# Patient Record
Sex: FEMALE | Race: BLACK OR AFRICAN AMERICAN | Employment: FULL TIME | URBAN - METROPOLITAN AREA
[De-identification: names, ages, dates, MRNs, and addresses within clinical notes are randomized per-mention and may not be internally consistent; named-entity substitution may affect disease eponyms.]

---

## 2022-01-09 ENCOUNTER — HOSPITAL ENCOUNTER (EMERGENCY)
Age: 32
Discharge: HOME | End: 2022-01-09
Attending: EMERGENCY MEDICINE
Payer: COMMERCIAL

## 2022-01-09 VITALS
WEIGHT: 210 LBS | TEMPERATURE: 99 F | BODY MASS INDEX: 31.83 KG/M2 | DIASTOLIC BLOOD PRESSURE: 80 MMHG | HEIGHT: 68 IN | SYSTOLIC BLOOD PRESSURE: 123 MMHG | OXYGEN SATURATION: 99 % | HEART RATE: 75 BPM | RESPIRATION RATE: 18 BRPM

## 2022-01-09 DIAGNOSIS — U07.1 COVID-19 VIRUS INFECTION: Primary | ICD-10-CM

## 2022-01-09 LAB
COVID-19 RAPID TEST, COVR: DETECTED
FLUAV AG NPH QL IA: NEGATIVE
FLUBV AG NPH QL IA: NEGATIVE
SOURCE, COVRS: ABNORMAL
SPECIMEN SOURCE: NORMAL

## 2022-01-09 PROCEDURE — 99283 EMERGENCY DEPT VISIT LOW MDM: CPT

## 2022-01-09 PROCEDURE — 74011250637 HC RX REV CODE- 250/637: Performed by: NURSE PRACTITIONER

## 2022-01-09 PROCEDURE — 87635 SARS-COV-2 COVID-19 AMP PRB: CPT

## 2022-01-09 PROCEDURE — 87804 INFLUENZA ASSAY W/OPTIC: CPT

## 2022-01-09 RX ORDER — DEXAMETHASONE SODIUM PHOSPHATE 100 MG/10ML
10 INJECTION INTRAMUSCULAR; INTRAVENOUS
Status: COMPLETED | OUTPATIENT
Start: 2022-01-09 | End: 2022-01-09

## 2022-01-09 RX ADMIN — DEXAMETHASONE SODIUM PHOSPHATE 10 MG: 10 INJECTION INTRAMUSCULAR; INTRAVENOUS at 11:20

## 2022-01-09 NOTE — ED TRIAGE NOTES
Pt with ear pain, sore throat and chills since last Saturday. Pt states she was tested for covid Sunday but it was negative. Pt states daughter had covid.     Nat Conn RN

## 2022-01-09 NOTE — Clinical Note
[id number] [address] EMERGENCY DEPT  [address] [address] [phone]    Work/School Note    Date: 1/9/2022     To Whom It May concern:    Archie Jacobs was evaluated by the following provider(s):  Attending Provider: Rosales Quan MD  Nurse Practitioner: Pravin Valles NP.   COVID19 virus is suspected. Per the CDC guidelines we recommend home isolation until the following conditions are all met:    1. At least five days have passed since symptoms first appeared and/or had a close exposure,   2. After home isolation for five days, wearing a mask around others for the next five days,  3. At least 24 have passed since last fever without the use of fever-reducing medications and  4.  Symptoms (eg cough, shortness of breath) have improved      Sincerely,          Peggy Peterson RN

## 2022-01-09 NOTE — Clinical Note
[address] [address] EMERGENCY DEPT  [address] [address] [phone]    Work/School Note    Date: 1/9/2022     To Whom It May concern:    Serenity An was evaluated by the following provider(s):  Attending Provider: Michaelle Larsen MD  Nurse Practitioner: Bette Wyatt NP.   COVID19 virus is suspected. Per the CDC guidelines we recommend home isolation until the following conditions are all met:    1. At least five days have passed since symptoms first appeared and/or had a close exposure,   2. After home isolation for five days, wearing a mask around others for the next five days,  3. At least 24 have passed since last fever without the use of fever-reducing medications and  4.  Symptoms (eg cough, shortness of breath) have improved      Sincerely,          Luis Manuel Black NP

## 2022-01-09 NOTE — Clinical Note
[address] [address] EMERGENCY DEPT  [address] [address] [phone]    Work/School Note    Date: 1/9/2022     To Whom It May concern:    Demarco Alcantara was evaluated by the following provider(s):  Attending Provider: Aurea Bahena MD  Nurse Practitioner: Maximiliano Penny NP.   COVID19 virus is suspected. Per the CDC guidelines we recommend home isolation until the following conditions are all met:    1. At least five days have passed since symptoms first appeared and/or had a close exposure,   2. After home isolation for five days, wearing a mask around others for the next five days,  3. At least 24 have passed since last fever without the use of fever-reducing medications and  4.  Symptoms (eg cough, shortness of breath) have improved      Sincerely,          Garcia Delacruz RN

## 2022-01-09 NOTE — ED PROVIDER NOTES
26-year-old female who presents to the emergency department for complaint of ear fullness, sore throat, cough, and chills since last Saturday. She reports that she got tested for COVID last Sunday and had a negative result. She has been taking Mucinex, which has helped to relieve her symptoms. She states her daughter recently tested positive for COVID. She denies nausea, vomiting, diarrhea, chest pain, or shortness of breath. The history is provided by the patient. Ear Pain  This is a new problem. The current episode started more than 1 week ago. The problem occurs constantly. The problem has not changed since onset. Pertinent negatives include no chest pain, no abdominal pain, no headaches and no shortness of breath. Nothing aggravates the symptoms. Nothing relieves the symptoms.         Past Medical History:   Diagnosis Date    Herpes simplex without mention of complication     takes valtrex    HX OTHER MEDICAL     (+) gonhorrea        Past Surgical History:   Procedure Laterality Date    HX OTHER SURGICAL      EGD diliation          Family History:   Problem Relation Age of Onset    Diabetes Mother     Hypertension Father     Diabetes Maternal Grandmother     Hypertension Maternal Grandmother        Social History     Socioeconomic History    Marital status: SINGLE     Spouse name: Not on file    Number of children: Not on file    Years of education: Not on file    Highest education level: Not on file   Occupational History    Not on file   Tobacco Use    Smoking status: Never Smoker    Smokeless tobacco: Never Used   Substance and Sexual Activity    Alcohol use: No    Drug use: No    Sexual activity: Yes     Partners: Male     Birth control/protection: Injection   Other Topics Concern    Not on file   Social History Narrative    Not on file     Social Determinants of Health     Financial Resource Strain:     Difficulty of Paying Living Expenses: Not on file   Food Insecurity:     Worried About Running Out of Food in the Last Year: Not on file    Ran Out of Food in the Last Year: Not on file   Transportation Needs:     Lack of Transportation (Medical): Not on file    Lack of Transportation (Non-Medical): Not on file   Physical Activity:     Days of Exercise per Week: Not on file    Minutes of Exercise per Session: Not on file   Stress:     Feeling of Stress : Not on file   Social Connections:     Frequency of Communication with Friends and Family: Not on file    Frequency of Social Gatherings with Friends and Family: Not on file    Attends Church Services: Not on file    Active Member of [address] [place] [address] [employer] or Organizations: Not on file    Attends Club or Organization Meetings: Not on file    Marital Status: Not on file   Intimate Partner Violence:     Fear of Current or Ex-Partner: Not on file    Emotionally Abused: Not on file    Physically Abused: Not on file    Sexually Abused: Not on file   Housing Stability:     Unable to Pay for Housing in the Last Year: Not on file    Number of [place] in the Last Year: Not on file    Unstable Housing in the Last Year: Not on file         ALLERGIES: Patient has no known allergies. Review of Systems   Constitutional: Positive for chills and fever. HENT: Positive for congestion, ear pain and sore throat. Respiratory: Positive for cough. Negative for shortness of breath. Cardiovascular: Negative for chest pain. Gastrointestinal: Negative for abdominal pain, diarrhea, nausea and vomiting. Neurological: Negative for headaches. All other systems reviewed and are negative. Vitals:    01/09/22 1111 01/09/22 1224   BP: 127/73 123/80   Pulse: 79 75   Resp: 16 18   Temp: 99 °F (37.2 °C)    SpO2: 99% 99%   Weight: 95.3 kg (210 lb)    Height: 5' 8\" (1.727 m)             Physical Exam  Vitals and nursing note reviewed. Constitutional:       General: She is not in acute distress. Appearance: Normal appearance.  She is not ill-appearing, toxic-appearing or diaphoretic. HENT:      Head: Normocephalic and atraumatic. Right Ear: Hearing, ear canal and external ear normal. No tenderness. A middle ear effusion is present. Tympanic membrane is not erythematous. Left Ear: Hearing, ear canal and external ear normal. No tenderness. A middle ear effusion is present. Tympanic membrane is not erythematous. Nose: Congestion present. Mouth/Throat:      Mouth: Mucous membranes are moist. No angioedema. Pharynx: Oropharynx is clear. Uvula midline. Posterior oropharyngeal erythema present. No pharyngeal swelling, oropharyngeal exudate or uvula swelling. Eyes:      General: No scleral icterus. Extraocular Movements: Extraocular movements intact. Conjunctiva/sclera: Conjunctivae normal.   Cardiovascular:      Rate and Rhythm: Normal rate. Heart sounds: Normal heart sounds. Pulmonary:      Effort: Pulmonary effort is normal. No respiratory distress. Breath sounds: Normal breath sounds. Abdominal:      General: Abdomen is flat. There is no distension. Musculoskeletal:         General: Normal range of motion. Cervical back: Normal range of motion and neck supple. No rigidity. Skin:     General: Skin is warm and dry. Capillary Refill: Capillary refill takes less than 2 seconds. Neurological:      General: No focal deficit present. Mental Status: She is alert and oriented to person, place, and time. Psychiatric:         Mood and Affect: Mood normal.         Behavior: Behavior normal.         Thought Content: Thought content normal.         Judgment: Judgment normal.          MDM  Number of Diagnoses or Management Options  COVID-19 virus infection: new and requires workup  Diagnosis management comments: 68-year-old female presents emergency department today with complaints of ear pain, sore throat, cough, chills since last Saturday. Lung sounds are clear. SPO2 is 99% on room air. Patient has some mild middle ear effusions bilaterally but does not appear as infected. No angioedema. Mild erythema to posterior oropharynx. Rapid Covid is positive. Supportive treatment Discussed and encouraged. I have discussed the results of all labs, procedures, radiographs, and/or treatments with the patient and available family members. Neil Form is agreed upon by the patient and the patient is ready for discharge.  Questions about treatment in the ED and differential diagnosis of presenting condition were answered. Eliza Crowe was given verbal discharge instructions including, but not limited to, importance of returning to the emergency department for any concern of worsening or continued symptoms.  Instructions were given to follow up with a primary care provider or specialist within 1-2 days. Burtis Dress effects of medications, if prescribed, were discussed and patient was advised to refrain from significant physical activity until followed up by primary care physician and to not drive or operate heavy machinery after taking any sedating substances.      During patient's visit to the emergency department in addition to providing emergency care I conducted a COVID-19 counseling visit. She is aware that he needs immediate isolation away from others and that she is to inform any close contacts about her condition. I have also reviewed the signs and symptoms of COVID-19 including the potential for progression of the disease including return precautions and an outpatient plan. We have discussed ways to be successful in the quarantine process and provided instructions about when it is safe to stop strict isolation. She has been informed that the public health department should be contacting her. Dulce Lemos NP; 1/9/2022 @3:26 PM Voice dictation software was used during the making of  this note. This software is not perfect and grammatical and other typographical errors  may be present.  This note has not been proofread for errors.          Amount and/or Complexity of Data Reviewed  Clinical lab tests: reviewed    Risk of Complications, Morbidity, and/or Mortality  Presenting problems: low  Diagnostic procedures: low  Management options: low    Patient Progress  Patient progress: improved        Recent Results (from the past 8 hour(s))   COVID-19 RAPID TEST    Collection Time: 01/09/22 11:14 AM   Result Value Ref Range    Specimen source Nasopharyngeal      COVID-19 rapid test Detected (AA) NOTD     INFLUENZA A & B AG (RAPID TEST)    Collection Time: 01/09/22 11:15 AM   Result Value Ref Range    Influenza A Ag Negative NEG      Influenza B Ag Negative NEG      Source Nasopharyngeal           Procedures

## 2022-01-09 NOTE — ED NOTES
I have reviewed discharge instructions with the patient. The patient verbalized understanding. Patient left ED via Discharge Method: ambulatory to Home with self. Opportunity for questions and clarification provided. No acute distress present      Patient given 0 scripts. To continue your aftercare when you leave the hospital, you may receive an automated call from our care team to check in on how you are doing. This is a free service and part of our promise to provide the best care and service to meet your aftercare needs.  If you have questions, or wish to unsubscribe from this service please call [phone]. Thank you for Choosing our [facility] Emergency Department.

## 2022-01-09 NOTE — DISCHARGE INSTRUCTIONS
Take over-the-counter Tylenol or Motrin if needed for body aches, fever, or headache. Try over-the-counter flonase nasal spray to help relieve the pressure in your ears. Increase oral hydration. Please quarantine at home. Return to the emergency department for any new, worsening, or concerning symptoms.